# Patient Record
Sex: FEMALE | URBAN - METROPOLITAN AREA
[De-identification: names, ages, dates, MRNs, and addresses within clinical notes are randomized per-mention and may not be internally consistent; named-entity substitution may affect disease eponyms.]

---

## 2023-07-12 ENCOUNTER — OFFICE VISIT (OUTPATIENT)
Facility: LOCATION | Age: 63
End: 2023-07-12
Payer: COMMERCIAL

## 2023-07-12 DIAGNOSIS — H04.123 DRY EYE SYNDROME OF BILATERAL LACRIMAL GLANDS: ICD-10-CM

## 2023-07-12 DIAGNOSIS — Z79.899 OTHER LONG TERM (CURRENT) DRUG THERAPY: Primary | ICD-10-CM

## 2023-07-12 DIAGNOSIS — H16.223 KERATOCONJUNCT SICCA, NOT SPECIFIED AS SJOGREN'S, BILATERAL: ICD-10-CM

## 2023-07-12 PROCEDURE — 92134 CPTRZ OPH DX IMG PST SGM RTA: CPT | Performed by: OPHTHALMOLOGY

## 2023-07-12 PROCEDURE — 92083 EXTENDED VISUAL FIELD XM: CPT | Performed by: OPHTHALMOLOGY

## 2023-07-12 RX ORDER — MELOXICAM 15 MG/1
15 MG TABLET ORAL
Refills: 0 | Status: ACTIVE
Start: 2023-07-12

## 2023-07-12 RX ORDER — IRBESARTAN 300 MG/1
300 MG TABLET, FILM COATED ORAL
Refills: 0 | Status: ACTIVE
Start: 2023-07-12

## 2023-07-12 RX ORDER — HYDROXYCHLOROQUINE SULFATE 200 MG/1
200 MG TABLET, FILM COATED ORAL AS DIRECTED
Qty: 30 | Refills: 0 | Status: ACTIVE
Start: 2023-07-12

## 2023-07-12 RX ORDER — LOVASTATIN 20 MG/1
20 MG TABLET ORAL
Refills: 0 | Status: ACTIVE
Start: 2023-07-12

## 2023-07-12 RX ORDER — NEOMYCIN SULFATE, POLYMYXIN B SULFATE AND DEXAMETHASONE 3.5; 10000; 1 MG/G; [USP'U]/G; MG/G
OINTMENT OPHTHALMIC
Qty: 3.5 | Refills: 1 | Status: ACTIVE
Start: 2023-07-12

## 2023-07-12 RX ORDER — FAMOTIDINE 20 MG/1
20 MG TABLET, FILM COATED ORAL
Refills: 0 | Status: ACTIVE
Start: 2023-07-12

## 2023-07-12 ASSESSMENT — INTRAOCULAR PRESSURE
OD: 18
OS: 17

## 2023-07-12 NOTE — IMPRESSION/PLAN
Impression: Keratoconjunct sicca, not specified as Sjogren's, bilateral: H72.473. Plan: See dry eye plan.

## 2023-07-12 NOTE — IMPRESSION/PLAN
Impression: Dry eye syndrome of bilateral lacrimal glands: H04.123. Patient complains of irritation OD. Discussed and recommended punctal plugs. Extended plugs x 4 done today. Patient tolerated the procedure well. Plan: Encouraged patient to consistently use PFATs QID OU. Start Maxitrol mary PRN -rx sent. RTC in 6 months for re-evaluation with Dr. Ana Mcginnis.

## 2023-07-12 NOTE — IMPRESSION/PLAN
Impression: Other long term (current) drug therapy: Z79.899. Patient is currently on Plaquenil. HVF 10-2 OU and OCT MAC OU done today. Testing is WNL.  Plan: Patient to keep all scheduled appointments with rheumatologist.

## 2025-06-11 ENCOUNTER — OFFICE VISIT (OUTPATIENT)
Facility: LOCATION | Age: 65
End: 2025-06-11
Payer: MEDICARE

## 2025-06-11 DIAGNOSIS — H40.023 OPEN ANGLE WITH BORDERLINE FINDINGS, HIGH RISK, BILATERAL: Primary | ICD-10-CM

## 2025-06-11 PROCEDURE — 92133 CPTRZD OPH DX IMG PST SGM ON: CPT | Performed by: OPTOMETRIST

## 2025-06-11 PROCEDURE — 99213 OFFICE O/P EST LOW 20 MIN: CPT | Performed by: OPTOMETRIST

## 2025-06-11 ASSESSMENT — INTRAOCULAR PRESSURE
OD: 39
OS: 36